# Patient Record
(demographics unavailable — no encounter records)

---

## 2024-10-22 NOTE — ASSESSMENT
[FreeTextEntry1] : Discussed diagnosis and treatment with patient  Discussed etiology of symptoms patient is experiencing  s/p 10/1/24 Aseptic debridement of nails 1-5 bilateral reducing the length with a sterile nail clipper manually and reduced girth by power reggie  Discussed proper shoe gear with patient  Discussed the importance of alternating shoe gear every other day  Discussed the importance of appropriate moisture balance  Demonstrated proper stretching techniques for B/L calves and Achilles tendons Discussed the importance of hydration and proper nutrition as patient admits to not hydrating or eating enough Rx gabapentin 300 mg PO QD at night Rx diclofenac 1% gel for topical pain relief per request Recommended OTC Vitamin B-complex supplements  Patient to return to the office in 3 months

## 2024-10-22 NOTE — REASON FOR VISIT
[Follow-Up Visit] : a follow-up visit for [Foot Pain] : foot pain [FreeTextEntry2] : swelling of her feet

## 2024-10-22 NOTE — PHYSICAL EXAM
[General Appearance - Alert] : alert [General Appearance - In No Acute Distress] : in no acute distress [2+] : left foot posterior tibialis 2+ [1+] : left foot dorsalis pedis 1+ [Diminished Throughout Right Foot] : diminished sensation with monofilament testing throughout right foot [Diminished Throughout Left Foot] : diminished sensation with monofilament testing throughout left foot [Oriented To Time, Place, And Person] : oriented to person, place, and time [Impaired Insight] : insight and judgment were intact [Affect] : the affect was normal [Delayed in the Right Toes] : capillary refills normal in right toes [Delayed in the Left Toes] : capillary refills normal in the left toes [de-identified] : (Achilles Tendinopathy) Noted mild thickening/tenderness of Achilles tendon Right Left and pain on palpation at 2 to 6 cm proximal to Achilles insertion.  Muscle strength 5/5 all major muscle groups bilateral.  [FreeTextEntry1] : Nails 1-5 left right painful, thickened, discolored, dystrophic with subungual debris No open ulcerations or breaks in the integument bilateral

## 2024-10-22 NOTE — HISTORY OF PRESENT ILLNESS
[Other: ____] : [unfilled] [FreeTextEntry1] : Presents in the [place] office.  Patient is present with swelling of her feet that started about 2 weeks ago but is aware that her swelling went down recently. Patient has complaints of cramping. Denies taking any medication of cramping or swelling.   Was diagnosed with diabetes, years ago.

## 2024-12-10 NOTE — DISCUSSION/SUMMARY
[FreeTextEntry1] : 74F Greek-speaking h/o chronic active smoker/emphysema, lung cancer (2011 s/p chemo/radiation), HTN, HLD, DM2 with neuropathy, CVA (2016), normocytic anemia, had [facility]ER visit on 9/2024 for food impaction, EKG with anteroseptal Q-wave, CT chest with severe coronary artery calcifications, bilateral carotid and aortic atherosclerosis, planning for EGD for dysphagia had EKG done with PCP office with poor R-wave progression, presents for cardiology evaluation.    EKG with poor R-wave progression, CT chest with diffuse coronary artery calcificataions confirmed CAD although she denies anginal complains, will obtain /echocardiogram to assess structural heart function and LV EF, defer stress testing for now as no ischemic abnormality or anginal complains, notable bilateral carotid bruit and R-calve pain suggest claudication with PAD, await carotid Duplex and ELIZABETH/PVR with LE arterial Duplex. Needs smoking cessation.    Abnormal EKG- await TTE, if overall normal biventricular systolic function without significant valvular abnormality, then no cardiac contraindication for EGD procedure.   Bilateral carotid bruit- await carotid Duplex  R-calve pain- await ELIZABETH/PVR and LE arterial Duplex  HTN- BP at goal on amlodipine   h/o CVA- on statin and chronic DAPT use with clopidogrel, can hold the latter x5 days prior to the EGD  Chronic active smoker, emphysema- needs smoking cessation, consider pulmonary eval. for PFT   Follow up in 2 months.   [EKG obtained to assist in diagnosis and management of assessed problem(s)] : EKG obtained to assist in diagnosis and management of assessed problem(s)

## 2024-12-10 NOTE — PHYSICAL EXAM
[Well Developed] : well developed [Well Nourished] : well nourished [No Acute Distress] : no acute distress [Normal Conjunctiva] : normal conjunctiva [Normal Venous Pressure] : normal venous pressure [Normal S1, S2] : normal S1, S2 [No Murmur] : no murmur [No Rub] : no rub [No Gallop] : no gallop [Clear Lung Fields] : clear lung fields [Good Air Entry] : good air entry [No Respiratory Distress] : no respiratory distress  [Soft] : abdomen soft [Non Tender] : non-tender [No Masses/organomegaly] : no masses/organomegaly [Normal Bowel Sounds] : normal bowel sounds [Normal Gait] : normal gait [No Edema] : no edema [No Cyanosis] : no cyanosis [No Clubbing] : no clubbing [No Varicosities] : no varicosities [No Rash] : no rash [No Skin Lesions] : no skin lesions [Moves all extremities] : moves all extremities [No Focal Deficits] : no focal deficits [Normal Speech] : normal speech [Alert and Oriented] : alert and oriented [Normal memory] : normal memory [de-identified] : thin [de-identified] : bilateral carotid bruit

## 2024-12-10 NOTE — HISTORY OF PRESENT ILLNESS
[FreeTextEntry1] : 74F Sami-speaking h/o chronic active smoker/emphysema, lung cancer (2011 s/p chemo/radiation), HTN, HLD, DM2 with neuropathy, CVA (2016), normocytic anemia, had [facility]ER visit on 9/2024 for food impaction, EKG with anteroseptal Q-wave, CT chest with severe coronary artery calcifications, bilateral carotid and aortic atherosclerosis, planning for EGD for dysphagia had EKG done with PCP office with poor R-wave progression, presents for cardiology evaluation.    EGD scheduled for the next 2 weeks, GI Dr. Altamirano   Patient presents with her daughter for the visit, reports with dysphagia for few months, baseline she is walking about 15 minutes daily, no chest pain or exertional dyspnea, stopped seeing pulmonologist for few years and has no prior cardiac evaluation, she has chronic R-calve pain when she walks, had been on DAPT with clopidogrel >4 yrs ago on prior hospitalization after CTA done told with intracranial artery stenosis?    Smoking since age 17, still smoking <1 pack daily No alcohol use No CAD/stroke in the family